# Patient Record
Sex: FEMALE | Race: WHITE | NOT HISPANIC OR LATINO | Employment: FULL TIME | URBAN - METROPOLITAN AREA
[De-identification: names, ages, dates, MRNs, and addresses within clinical notes are randomized per-mention and may not be internally consistent; named-entity substitution may affect disease eponyms.]

---

## 2017-04-25 ENCOUNTER — OFFICE VISIT (OUTPATIENT)
Dept: FAMILY MEDICINE CLINIC | Facility: CLINIC | Age: 47
End: 2017-04-25

## 2017-04-25 VITALS
WEIGHT: 160 LBS | TEMPERATURE: 98.1 F | BODY MASS INDEX: 25.71 KG/M2 | HEART RATE: 100 BPM | DIASTOLIC BLOOD PRESSURE: 86 MMHG | OXYGEN SATURATION: 99 % | SYSTOLIC BLOOD PRESSURE: 128 MMHG | HEIGHT: 66 IN

## 2017-04-25 DIAGNOSIS — E78.2 MIXED HYPERLIPIDEMIA: ICD-10-CM

## 2017-04-25 DIAGNOSIS — K90.9 INTESTINAL MALABSORPTION, UNSPECIFIED TYPE: ICD-10-CM

## 2017-04-25 DIAGNOSIS — E53.8 B12 DEFICIENCY: ICD-10-CM

## 2017-04-25 DIAGNOSIS — E61.1 IRON DEFICIENCY: Primary | ICD-10-CM

## 2017-04-25 DIAGNOSIS — E55.9 AVITAMINOSIS D: ICD-10-CM

## 2017-04-25 DIAGNOSIS — S22.000A COMPRESSION FRACTURE OF BODY OF THORACIC VERTEBRA (HCC): ICD-10-CM

## 2017-04-25 PROCEDURE — 99213 OFFICE O/P EST LOW 20 MIN: CPT | Performed by: NURSE PRACTITIONER

## 2017-04-25 RX ORDER — AZITHROMYCIN 250 MG/1
TABLET, FILM COATED ORAL
Refills: 0 | COMMUNITY
Start: 2017-03-14 | End: 2017-04-25

## 2017-04-25 RX ORDER — OXYCODONE HYDROCHLORIDE AND ACETAMINOPHEN 5; 325 MG/1; MG/1
1 TABLET ORAL EVERY 4 HOURS PRN
Qty: 18 TABLET | Refills: 0 | Status: SHIPPED | OUTPATIENT
Start: 2017-04-25 | End: 2017-04-25 | Stop reason: SDUPTHER

## 2017-04-25 RX ORDER — CEFDINIR 300 MG/1
CAPSULE ORAL
Refills: 0 | COMMUNITY
Start: 2017-04-06 | End: 2017-04-25

## 2017-04-25 RX ORDER — OXYCODONE HYDROCHLORIDE AND ACETAMINOPHEN 5; 325 MG/1; MG/1
1 TABLET ORAL EVERY 4 HOURS PRN
Qty: 18 TABLET | Refills: 0 | Status: SHIPPED | OUTPATIENT
Start: 2017-04-25 | End: 2017-05-11 | Stop reason: SDUPTHER

## 2017-04-25 RX ORDER — OXYCODONE HYDROCHLORIDE AND ACETAMINOPHEN 5; 325 MG/1; MG/1
TABLET ORAL
Refills: 0 | COMMUNITY
Start: 2017-04-19 | End: 2017-04-25 | Stop reason: SDUPTHER

## 2017-04-25 NOTE — PROGRESS NOTES
Subjective   Clarita Rodrigues is a 46 y.o. female.     History of Present Illness   Pt presented to [facility] ED on 4/18 with severe sudden onset back pain.  MRI revealed multiple thoracic compression fx's.  She has appt sheduled this week with [facility].  Wearing back brace.  Percocet prn pain-needs refill.  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  She has had numerous courses of systemic steroids for sinusitis prescribed by allergists where she works--[employer].    She has been noncompliant with medical plan.  Has not been seen > 1 year.     The following portions of the patient's history were reviewed and updated as appropriate: allergies, current medications, past family history, past medical history, past social history, past surgical history and problem list.    Review of Systems   Musculoskeletal: Positive for back pain.   All other systems reviewed and are negative.      Objective   Physical Exam   Constitutional: She appears well-developed and well-nourished.   Pulmonary/Chest: Effort normal.   Musculoskeletal:   Wearing back brace   Psychiatric: She has a normal mood and affect.   Nursing note and vitals reviewed.      Assessment/Plan   Clarita was seen today for back pain.    Diagnoses and all orders for this visit:    Iron deficiency  -     CBC & Differential  -     Lipid Panel With / Chol / HDL Ratio  -     Comprehensive Metabolic Panel  -     TSH  -     Urinalysis With Microscopic  -     Vitamin D 25 Hydroxy  -     Ferritin  -     Folate  -     Iron Profile  -     Vitamin B12    Avitaminosis D  -     CBC & Differential  -     Lipid Panel With / Chol / HDL Ratio  -     Comprehensive Metabolic Panel  -     TSH  -     Urinalysis With Microscopic  -     Vitamin D 25 Hydroxy  -     Ferritin  -     Folate  -     Iron Profile  -     Vitamin B12    B12 deficiency  -     CBC & Differential  -     Lipid Panel With / Chol / HDL Ratio  -     Comprehensive Metabolic Panel  -     TSH  -      Urinalysis With Microscopic  -     Vitamin D 25 Hydroxy  -     Ferritin  -     Folate  -     Iron Profile  -     Vitamin B12    Intestinal malabsorption, unspecified type  -     CBC & Differential  -     Lipid Panel With / Chol / HDL Ratio  -     Comprehensive Metabolic Panel  -     TSH  -     Urinalysis With Microscopic  -     Vitamin D 25 Hydroxy  -     Ferritin  -     Folate  -     Iron Profile  -     Vitamin B12    Mixed hyperlipidemia  -     CBC & Differential  -     Lipid Panel With / Chol / HDL Ratio  -     Comprehensive Metabolic Panel  -     TSH  -     Urinalysis With Microscopic  -     Vitamin D 25 Hydroxy  -     Ferritin  -     Folate  -     Iron Profile  -     Vitamin B12    Compression fracture of body of thoracic vertebra  -     DEXA Bone Density Axial; Future    Other orders  -     Discontinue: oxyCODONE-acetaminophen (PERCOCET) 5-325 MG per tablet; Take 1 tablet by mouth Every 4 (Four) Hours As Needed for Moderate Pain (4-6) or Severe Pain (7-10).  -     oxyCODONE-acetaminophen (PERCOCET) 5-325 MG per tablet; Take 1 tablet by mouth Every 4 (Four) Hours As Needed for Moderate Pain (4-6) or Severe Pain (7-10).      ALEXANDRA queried/acceptable.    I accidentally printed two percocet rx's--one was shredded.    Keep her appt with [facility] this Friday 4/28.  fbw tomorrow with f/u appt for chronic problem management 4/27.

## 2017-04-26 LAB
25(OH)D3+25(OH)D2 SERPL-MCNC: 23.1 NG/ML (ref 30–100)
ALBUMIN SERPL-MCNC: 4.1 G/DL (ref 3.5–5.2)
ALBUMIN/GLOB SERPL: 1.6 G/DL
ALP SERPL-CCNC: 117 U/L (ref 39–117)
ALT SERPL-CCNC: 13 U/L (ref 1–33)
APPEARANCE UR: CLEAR
AST SERPL-CCNC: 18 U/L (ref 1–32)
BACTERIA #/AREA URNS HPF: NORMAL /HPF
BASOPHILS # BLD AUTO: 0.04 10*3/MM3 (ref 0–0.2)
BASOPHILS NFR BLD AUTO: 0.5 % (ref 0–1.5)
BILIRUB SERPL-MCNC: 0.3 MG/DL (ref 0.1–1.2)
BILIRUB UR QL STRIP: NEGATIVE
BUN SERPL-MCNC: 17 MG/DL (ref 6–20)
BUN/CREAT SERPL: 20.5 (ref 7–25)
CALCIUM SERPL-MCNC: 9.2 MG/DL (ref 8.6–10.5)
CASTS URNS MICRO: NORMAL
CHLORIDE SERPL-SCNC: 101 MMOL/L (ref 98–107)
CHOLEST SERPL-MCNC: 167 MG/DL (ref 0–200)
CHOLEST/HDLC SERPL: 2.26 {RATIO}
CO2 SERPL-SCNC: 22 MMOL/L (ref 22–29)
COLOR UR: YELLOW
CREAT SERPL-MCNC: 0.83 MG/DL (ref 0.57–1)
EOSINOPHIL # BLD AUTO: 0.23 10*3/MM3 (ref 0–0.7)
EOSINOPHIL NFR BLD AUTO: 3 % (ref 0.3–6.2)
EPI CELLS #/AREA URNS HPF: NORMAL /HPF
ERYTHROCYTE [DISTWIDTH] IN BLOOD BY AUTOMATED COUNT: 19.8 % (ref 11.7–13)
FERRITIN SERPL-MCNC: 22.55 NG/ML (ref 13–150)
FOLATE SERPL-MCNC: 7.12 NG/ML (ref 4.78–24.2)
GLOBULIN SER CALC-MCNC: 2.6 GM/DL
GLUCOSE SERPL-MCNC: 76 MG/DL (ref 65–99)
GLUCOSE UR QL: NEGATIVE
HCT VFR BLD AUTO: 33.8 % (ref 35.6–45.5)
HDLC SERPL-MCNC: 74 MG/DL (ref 40–60)
HGB BLD-MCNC: 9.8 G/DL (ref 11.9–15.5)
HGB UR QL STRIP: NEGATIVE
IMM GRANULOCYTES # BLD: 0.07 10*3/MM3 (ref 0–0.03)
IMM GRANULOCYTES NFR BLD: 0.9 % (ref 0–0.5)
IRON SATN MFR SERPL: 5 % (ref 20–50)
IRON SERPL-MCNC: 21 MCG/DL (ref 37–145)
KETONES UR QL STRIP: NEGATIVE
LDLC SERPL CALC-MCNC: 55 MG/DL (ref 0–100)
LEUKOCYTE ESTERASE UR QL STRIP: NEGATIVE
LYMPHOCYTES # BLD AUTO: 1.89 10*3/MM3 (ref 0.9–4.8)
LYMPHOCYTES NFR BLD AUTO: 24.8 % (ref 19.6–45.3)
MCH RBC QN AUTO: 22.1 PG (ref 26.9–32)
MCHC RBC AUTO-ENTMCNC: 29 G/DL (ref 32.4–36.3)
MCV RBC AUTO: 76.3 FL (ref 80.5–98.2)
MONOCYTES # BLD AUTO: 0.45 10*3/MM3 (ref 0.2–1.2)
MONOCYTES NFR BLD AUTO: 5.9 % (ref 5–12)
NEUTROPHILS # BLD AUTO: 4.95 10*3/MM3 (ref 1.9–8.1)
NEUTROPHILS NFR BLD AUTO: 64.9 % (ref 42.7–76)
NITRITE UR QL STRIP: NEGATIVE
PH UR STRIP: 6 [PH] (ref 5–8)
PLATELET # BLD AUTO: 453 10*3/MM3 (ref 140–500)
POTASSIUM SERPL-SCNC: 4 MMOL/L (ref 3.5–5.2)
PROT SERPL-MCNC: 6.7 G/DL (ref 6–8.5)
PROT UR QL STRIP: NEGATIVE
RBC # BLD AUTO: 4.43 10*6/MM3 (ref 3.9–5.2)
RBC #/AREA URNS HPF: NORMAL /HPF
SODIUM SERPL-SCNC: 142 MMOL/L (ref 136–145)
SP GR UR: 1.01 (ref 1–1.03)
TIBC SERPL-MCNC: 434 MCG/DL
TRIGL SERPL-MCNC: 190 MG/DL (ref 0–150)
TSH SERPL DL<=0.005 MIU/L-ACNC: 2.81 MIU/ML (ref 0.27–4.2)
UIBC SERPL-MCNC: 413 MCG/DL
UROBILINOGEN UR STRIP-MCNC: (no result) MG/DL
VIT B12 SERPL-MCNC: 979 PG/ML (ref 211–946)
VLDLC SERPL CALC-MCNC: 38 MG/DL (ref 5–40)
WBC # BLD AUTO: 7.63 10*3/MM3 (ref 4.5–10.7)
WBC #/AREA URNS HPF: NORMAL /HPF

## 2017-05-02 RX ORDER — CYANOCOBALAMIN 1000 UG/ML
INJECTION, SOLUTION INTRAMUSCULAR; SUBCUTANEOUS
Qty: 3 ML | Refills: 0 | Status: SHIPPED | OUTPATIENT
Start: 2017-05-02

## 2017-05-02 RX ORDER — ERGOCALCIFEROL 1.25 MG/1
CAPSULE ORAL
Qty: 12 CAPSULE | Refills: 0 | Status: SHIPPED | OUTPATIENT
Start: 2017-05-02 | End: 2017-06-30 | Stop reason: SDUPTHER

## 2017-05-03 ENCOUNTER — TELEPHONE (OUTPATIENT)
Dept: FAMILY MEDICINE CLINIC | Facility: CLINIC | Age: 47
End: 2017-05-03

## 2017-05-11 ENCOUNTER — TELEPHONE (OUTPATIENT)
Dept: FAMILY MEDICINE CLINIC | Facility: CLINIC | Age: 47
End: 2017-05-11

## 2017-05-11 RX ORDER — OXYCODONE HYDROCHLORIDE AND ACETAMINOPHEN 5; 325 MG/1; MG/1
1 TABLET ORAL EVERY 4 HOURS PRN
Qty: 18 TABLET | Refills: 0 | Status: SHIPPED | OUTPATIENT
Start: 2017-05-11 | End: 2017-06-16 | Stop reason: SDUPTHER

## 2017-06-14 DIAGNOSIS — S22.000S COMPRESSION FRACTURE OF THORACIC VERTEBRA, SEQUELA: Primary | ICD-10-CM

## 2017-06-15 ENCOUNTER — TELEPHONE (OUTPATIENT)
Dept: FAMILY MEDICINE CLINIC | Facility: CLINIC | Age: 47
End: 2017-06-15

## 2017-06-15 NOTE — TELEPHONE ENCOUNTER
----- Message from ERICKA Saunders sent at 6/14/2017  5:06 PM EDT -----  Regarding: FW: referral  Contact: [phone]  Call pt.  Let her know that I have placed an order for pain management referral.  If she has not heard from pain management by Monday she should call Alia at our office to f/u.      ----- Message -----     From: Dennies Garrick     Sent: 6/13/2017   9:17 AM       To: ERICKA Saunders  Subject: referral                                         Ms. Rodrigues is requesting a referral to pain management. She is schedule to have 3 surgery in September. Surgeon request that she sees pain management .

## 2017-06-16 ENCOUNTER — TELEPHONE (OUTPATIENT)
Dept: FAMILY MEDICINE CLINIC | Facility: CLINIC | Age: 47
End: 2017-06-16

## 2017-06-16 RX ORDER — OXYCODONE HYDROCHLORIDE AND ACETAMINOPHEN 5; 325 MG/1; MG/1
1 TABLET ORAL EVERY 4 HOURS PRN
Qty: 18 TABLET | Refills: 0 | Status: SHIPPED | OUTPATIENT
Start: 2017-06-16 | End: 2017-06-22 | Stop reason: SDUPTHER

## 2017-06-16 RX ORDER — OXYCODONE HYDROCHLORIDE AND ACETAMINOPHEN 5; 325 MG/1; MG/1
1 TABLET ORAL EVERY 4 HOURS PRN
Qty: 18 TABLET | Refills: 0 | Status: CANCELLED | OUTPATIENT
Start: 2017-06-16

## 2017-06-16 NOTE — TELEPHONE ENCOUNTER
Okay to give one refill.  Call pt.  She can  rx before we close today.  Pain management appt pending.

## 2017-06-16 NOTE — TELEPHONE ENCOUNTER
----- Message from ERICKA Saunders sent at 6/14/2017  5:06 PM EDT -----  Regarding: FW: referral  Contact: [phone]  Call pt.  Let her know that I have placed an order for pain management referral.  If she has not heard from pain management by Monday she should call Alia at our office to f/u.      ----- Message -----     From: Dennies Garrick     Sent: 6/13/2017   9:17 AM       To: ERICKA Saunders  Subject: referral                                         Ms. Rodrigues is requesting a referral to pain management. She is schedule to have 3 surgery in September. Surgeon request that she sees pain management .

## 2017-06-16 NOTE — TELEPHONE ENCOUNTER
LEFT DETAILED MESSAGE WITH CALL BACK NUMBER. PATIENT NEEDED REFILLS. I TOLD HER TO CALL ME AND LET ME KNOW WHICH MEDICATION SHE WANTED REFILLED.

## 2017-06-16 NOTE — TELEPHONE ENCOUNTER
----- Message from ERICKA Saunders sent at 6/14/2017  5:06 PM EDT -----  Regarding: FW: referral  Contact: [phone]  Call pt.  Let her know that I have placed an order for pain management referral.  If she has not heard from pain management by Monday she should call Alia at our office to f/u.      ----- Message -----     From: Dennies Garrick     Sent: 6/13/2017   9:17 AM       To: ERICKA Saunders  Subject: referral                                         Ms. Rodrigues is requesting a referral to pain management. She is schedule to have 3 surgery in September. Surgeon request that she sees pain management .

## 2017-06-22 RX ORDER — OXYCODONE HYDROCHLORIDE AND ACETAMINOPHEN 5; 325 MG/1; MG/1
1 TABLET ORAL EVERY 4 HOURS PRN
Qty: 18 TABLET | Refills: 0 | Status: SHIPPED | OUTPATIENT
Start: 2017-06-22 | End: 2017-06-27 | Stop reason: SDUPTHER

## 2017-06-22 RX ORDER — OXYCODONE HYDROCHLORIDE AND ACETAMINOPHEN 5; 325 MG/1; MG/1
1 TABLET ORAL EVERY 4 HOURS PRN
Qty: 18 TABLET | Refills: 0 | Status: CANCELLED | OUTPATIENT
Start: 2017-06-22

## 2017-06-27 ENCOUNTER — TELEPHONE (OUTPATIENT)
Dept: FAMILY MEDICINE CLINIC | Facility: CLINIC | Age: 47
End: 2017-06-27

## 2017-06-27 RX ORDER — OXYCODONE HYDROCHLORIDE AND ACETAMINOPHEN 5; 325 MG/1; MG/1
1 TABLET ORAL EVERY 4 HOURS PRN
Qty: 18 TABLET | Refills: 0 | Status: SHIPPED | OUTPATIENT
Start: 2017-06-27 | End: 2017-07-03 | Stop reason: SDUPTHER

## 2017-06-27 RX ORDER — OXYCODONE HYDROCHLORIDE AND ACETAMINOPHEN 5; 325 MG/1; MG/1
1 TABLET ORAL EVERY 4 HOURS PRN
Qty: 18 TABLET | Refills: 0 | Status: CANCELLED | OUTPATIENT
Start: 2017-06-27

## 2017-06-30 RX ORDER — ERGOCALCIFEROL 1.25 MG/1
CAPSULE ORAL
Qty: 12 CAPSULE | Refills: 0 | Status: SHIPPED | OUTPATIENT
Start: 2017-06-30

## 2017-07-03 ENCOUNTER — HOSPITAL ENCOUNTER (OUTPATIENT)
Dept: PAIN MEDICINE | Facility: HOSPITAL | Age: 47
Discharge: HOME | End: 2017-07-03

## 2017-07-03 RX ORDER — OXYCODONE HYDROCHLORIDE AND ACETAMINOPHEN 5; 325 MG/1; MG/1
1 TABLET ORAL EVERY 4 HOURS PRN
Qty: 18 TABLET | Refills: 0 | Status: SHIPPED | OUTPATIENT
Start: 2017-07-03

## 2017-07-03 RX ORDER — OXYCODONE HYDROCHLORIDE AND ACETAMINOPHEN 5; 325 MG/1; MG/1
1 TABLET ORAL EVERY 4 HOURS PRN
Qty: 18 TABLET | Refills: 0 | Status: CANCELLED | OUTPATIENT
Start: 2017-07-03

## 2017-07-19 ENCOUNTER — APPOINTMENT (OUTPATIENT)
Dept: PAIN MEDICINE | Facility: HOSPITAL | Age: 47
End: 2017-07-19